# Patient Record
Sex: FEMALE | Race: WHITE | NOT HISPANIC OR LATINO | Employment: FULL TIME | URBAN - METROPOLITAN AREA
[De-identification: names, ages, dates, MRNs, and addresses within clinical notes are randomized per-mention and may not be internally consistent; named-entity substitution may affect disease eponyms.]

---

## 2019-07-17 ENCOUNTER — TRANSFERRED RECORDS (OUTPATIENT)
Dept: HEALTH INFORMATION MANAGEMENT | Facility: CLINIC | Age: 59
End: 2019-07-17

## 2023-07-11 ENCOUNTER — TRANSFERRED RECORDS (OUTPATIENT)
Dept: HEALTH INFORMATION MANAGEMENT | Facility: CLINIC | Age: 63
End: 2023-07-11

## 2024-02-09 ENCOUNTER — TRANSFERRED RECORDS (OUTPATIENT)
Dept: HEALTH INFORMATION MANAGEMENT | Facility: CLINIC | Age: 64
End: 2024-02-09

## 2024-02-09 LAB — CREATININE (EXTERNAL): 0.76 MG/DL (ref 0.52–1.04)

## 2024-02-15 ENCOUNTER — TRANSFERRED RECORDS (OUTPATIENT)
Dept: HEALTH INFORMATION MANAGEMENT | Facility: CLINIC | Age: 64
End: 2024-02-15

## 2024-07-10 ENCOUNTER — MEDICAL CORRESPONDENCE (OUTPATIENT)
Dept: HEALTH INFORMATION MANAGEMENT | Facility: CLINIC | Age: 64
End: 2024-07-10
Payer: COMMERCIAL

## 2024-07-20 ENCOUNTER — HOSPITAL ENCOUNTER (OUTPATIENT)
Dept: CT IMAGING | Facility: CLINIC | Age: 64
Discharge: HOME | End: 2024-07-20
Attending: SPECIALIST | Admitting: SPECIALIST
Payer: COMMERCIAL

## 2024-07-20 DIAGNOSIS — E21.3 HYPERPARATHYROIDISM (H): ICD-10-CM

## 2024-07-20 PROCEDURE — 250N000011 HC RX IP 250 OP 636

## 2024-07-20 PROCEDURE — 250N000009 HC RX 250

## 2024-07-20 PROCEDURE — 70492 CT SFT TSUE NCK W/O & W/DYE: CPT

## 2024-07-20 RX ORDER — IOPAMIDOL 755 MG/ML
67 INJECTION, SOLUTION INTRAVASCULAR ONCE
Status: COMPLETED | OUTPATIENT
Start: 2024-07-20 | End: 2024-07-20

## 2024-07-20 RX ADMIN — SODIUM CHLORIDE 80 ML: 9 INJECTION, SOLUTION INTRAVENOUS at 10:03

## 2024-07-20 RX ADMIN — IOPAMIDOL 67 ML: 755 INJECTION, SOLUTION INTRAVENOUS at 10:03

## 2024-08-01 ENCOUNTER — OFFICE VISIT (OUTPATIENT)
Dept: SURGERY | Facility: CLINIC | Age: 64
End: 2024-08-01
Payer: COMMERCIAL

## 2024-08-01 VITALS
BODY MASS INDEX: 29.02 KG/M2 | WEIGHT: 170 LBS | HEART RATE: 74 BPM | OXYGEN SATURATION: 97 % | SYSTOLIC BLOOD PRESSURE: 120 MMHG | DIASTOLIC BLOOD PRESSURE: 64 MMHG | HEIGHT: 64 IN

## 2024-08-01 DIAGNOSIS — E21.3 HYPERPARATHYROIDISM (H): Primary | ICD-10-CM

## 2024-08-01 PROCEDURE — 99244 OFF/OP CNSLTJ NEW/EST MOD 40: CPT | Performed by: SURGERY

## 2024-08-01 RX ORDER — VITAMIN B COMPLEX
TABLET ORAL DAILY
COMMUNITY

## 2024-08-01 RX ORDER — MULTIVITAMIN
1 TABLET ORAL DAILY
COMMUNITY

## 2024-08-01 RX ORDER — BUSPIRONE HYDROCHLORIDE 15 MG/1
15 TABLET ORAL 3 TIMES DAILY
COMMUNITY

## 2024-08-01 NOTE — LETTER
"August 2, 2024          Kenzie Estrella MD  ENDOCRINOLOGY [facility]  [address]  [place] [address]      RE:   Alta Whatley 1960      Dear Colleague,    Thank you for referring your patient, Alta Whatley, to [facility] at [facility]. Please see a copy of my visit note below.     Alta Whatley is a 63 year old female who presented with a history of hyperparathyroidism.  She has been followed for hypercalcemia and hyperparathyroidism for years.  At one point she was noted to have osteopenia, but surgery was not recommended at that time.  No history of nephrolithiasis.  Her calcium and PTH have continued to climb.  Most recent calcium was 10.5, PTH was 150.  Her osteopenia, on her last DEXA scan, has focally shifted to osteoporosis.  No history of pathologic fractures.  She has been sent on for surgical discussion given the change in her bone density and apparently progressive disease.     PMH:  Alta Whatley  has no past medical history on file.  PSH:  Alta Whatley  has no past surgical history on file.     Home medications and allergies reviewed.     Social History:  Alta Whatley  reports that she has never smoked. She has never used smokeless tobacco.  Family History:  Alta Whatley family history is not on file.     ROS:  The 10 point Review of Systems is negative other than noted in the HPI.  Patient does complain of toe pain but no bone pain, concentration issues.     Physical Exam:  Blood pressure 120/64, pulse 74, height 1.626 m (5' 4\"), weight 77.1 kg (170 lb), SpO2 97%.  170 lbs 0 oz  Healthy appearing female in no distress.  Patient has a pleasant affect and communicates well.   Pupils equal round and reactive to light.   No cervical lymphadenopathy or thyromegaly.  Good neck range of motion.  Skin creases sufficient for thyroid surgery.  Lung fields clear, breathing comfortably.   Heart normal sinus rhythm.  No murmurs rubs or gallops.  Abdomen soft, nontender, " nondistended.  Skin warm, dry.  No obvious rashes or lesions.     All new lab and imaging data was reviewed.  This includes extensive personal review of CT scan images.  No obvious parathyroid adenoma but possible candidate on the right behind the thyroid.      Assessment/plan: Pleasant 63-year-old female with hyperparathyroidism.  This does not appear to be consistent with secondary hyperparathyroidism or FHH.  I have explained that 85% of these instances are due to a single parathyroid adenoma.  The other 15% can be caused by multi gland disease.  Her CT scan was nonlocalizing, although I felt there was a suggestion of adenoma on the right.  I have ordered a nuclear medicine study for further delineation.  I do not expect that this will necessarily help us but I want to rule out all options.  If all of her imaging studies are consistent with nonlocalizing disease, I would then recommend a bilateral neck exploration with 4 gland evaluation.  She understands that there is a risk of failure to cure and hypoparathyroidism.  Surgery would likely be done as an outpatient.  I will touch base once the nuclear medicine study has been performed and we will get surgery scheduled.  Surgical co-morbities include heterozygous for factor V Leiden, anxiety, history of brain mass, dysthymia.    Again, thank you for allowing me to participate in the care of your patient.      Sincerely,      Lauro Villarreal MD

## 2024-08-02 PROBLEM — E21.3 HYPERPARATHYROIDISM (H): Status: ACTIVE | Noted: 2024-08-02

## 2024-08-02 NOTE — PROGRESS NOTES
"Surgery Consultation, [facility]         Lauro Villarreal MD, MD    Alta Whatley MRN# [id number]   YOB: 1960 Age: 63 year old     PCP:  Amy Daugherty    Chief Complaint: Hyperparathyroidism    Pt was seen in consultation from Amy Daugherty.    History of Present Illness:  Alta Whatley is a 63 year old female who presented with a history of hyperparathyroidism.  She has been followed for hypercalcemia and hyperparathyroidism for years.  At one point she was noted to have osteopenia, but surgery was not recommended at that time.  No history of nephrolithiasis.  Her calcium and PTH have continued to climb.  Most recent calcium was 10.5, PTH was 150.  Her osteopenia, on her last DEXA scan, has focally shifted to osteoporosis.  No history of pathologic fractures.  She has been sent on for surgical discussion given the change in her bone density and apparently progressive disease.    PMH:  Alta Whatley  has no past medical history on file.  PSH:  Alta Whatley  has no past surgical history on file.    Home medications and allergies reviewed.    Social History:  Alta Whatley  reports that she has never smoked. She has never used smokeless tobacco.  Family History:  Alta Whatley family history is not on file.    ROS:  The 10 point Review of Systems is negative other than noted in the HPI.  Patient does complain of toe pain but no bone pain, concentration issues.    Physical Exam:  Blood pressure 120/64, pulse 74, height 1.626 m (5' 4\"), weight 77.1 kg (170 lb), SpO2 97%.  170 lbs 0 oz  Healthy appearing female in no distress.  Patient has a pleasant affect and communicates well.   Pupils equal round and reactive to light.   No cervical lymphadenopathy or thyromegaly.  Good neck range of motion.  Skin creases sufficient for thyroid surgery.  Lung fields clear, breathing comfortably.   Heart normal sinus rhythm.  No murmurs rubs or gallops.  Abdomen soft, nontender, " nondistended.  Skin warm, dry.  No obvious rashes or lesions.    All new lab and imaging data was reviewed.  This includes extensive personal review of CT scan images.  No obvious parathyroid adenoma but possible candidate on the right behind the thyroid.     Assessment/plan: Pleasant 63-year-old female with hyperparathyroidism.  This does not appear to be consistent with secondary hyperparathyroidism or FHH.  I have explained that 85% of these instances are due to a single parathyroid adenoma.  The other 15% can be caused by multi gland disease.  Her CT scan was nonlocalizing, although I felt there was a suggestion of adenoma on the right.  I have ordered a nuclear medicine study for further delineation.  I do not expect that this will necessarily help us but I want to rule out all options.  If all of her imaging studies are consistent with nonlocalizing disease, I would then recommend a bilateral neck exploration with 4 gland evaluation.  She understands that there is a risk of failure to cure and hypoparathyroidism.  Surgery would likely be done as an outpatient.  I will touch base once the nuclear medicine study has been performed and we will get surgery scheduled.  Surgical co-morbities include heterozygous for factor V Leiden, anxiety, history of brain mass, dysthymia.    Franco Villarreal M.D.  [facility]  [phone]    Please route or send letter to:  Primary Care Provider (PCP) and Referring Provider

## 2024-09-05 ENCOUNTER — HOSPITAL ENCOUNTER (OUTPATIENT)
Dept: NUCLEAR MEDICINE | Facility: CLINIC | Age: 64
Setting detail: NUCLEAR MEDICINE
Discharge: HOME | End: 2024-09-05
Attending: SURGERY
Payer: COMMERCIAL

## 2024-09-05 DIAGNOSIS — E21.3 HYPERPARATHYROIDISM (H): ICD-10-CM

## 2024-09-05 PROCEDURE — 343N000001 HC RX 343: Performed by: SURGERY

## 2024-09-05 PROCEDURE — A9516 IODINE I-123 SOD IODIDE MIC: HCPCS | Performed by: SURGERY

## 2024-09-05 PROCEDURE — 78072 PARATHYRD PLANAR W/SPECT&CT: CPT

## 2024-09-05 PROCEDURE — A9500 TC99M SESTAMIBI: HCPCS | Performed by: SURGERY

## 2024-09-05 RX ADMIN — Medication 600 MICROCURIE: at 09:00

## 2024-09-05 RX ADMIN — Medication 27 MILLICURIE: at 12:00

## 2024-09-11 ENCOUNTER — TELEPHONE (OUTPATIENT)
Dept: SURGERY | Facility: CLINIC | Age: 64
End: 2024-09-11
Payer: COMMERCIAL

## 2024-09-11 NOTE — TELEPHONE ENCOUNTER
Name of caller: Patient    Reason for Call:  Alta calling to discuss her [place] imaging results and weather or not to schedule surgery?    Surgeon:  Lauro Villarreal MD    Recent Surgery:  No    If yes, when & what type:  N/A      Best phone number to reach pt at is: [phone]    Ok to leave a message with medical info? Yes.

## 2024-09-13 ENCOUNTER — PREP FOR PROCEDURE (OUTPATIENT)
Dept: SURGERY | Facility: CLINIC | Age: 64
End: 2024-09-13
Payer: COMMERCIAL

## 2024-09-13 DIAGNOSIS — E21.3 HYPERPARATHYROIDISM (H): Primary | ICD-10-CM

## 2024-09-19 ENCOUNTER — TELEPHONE (OUTPATIENT)
Dept: SURGERY | Facility: CLINIC | Age: 64
End: 2024-09-19
Payer: COMMERCIAL

## 2024-09-19 NOTE — TELEPHONE ENCOUNTER
Type of surgery: right parathyroidectomy  Location of surgery: [facility]  Date and time of surgery: 11/22/24 9:30am  Surgeon: Dr Villarreal  Pre-Op Appt Date: pt to schedule  Post-Op Appt Date: pt to schedule   Packet sent out: Yes  Pre-cert/Authorization completed:  Not Applicable  Date: 9/13/24     Clear bilaterally, pupils equal, round and reactive to light.

## 2024-10-06 ENCOUNTER — HEALTH MAINTENANCE LETTER (OUTPATIENT)
Age: 64
End: 2024-10-06

## 2024-11-18 RX ORDER — ALENDRONATE SODIUM 70 MG/1
70 TABLET ORAL
COMMUNITY

## 2024-11-18 RX ORDER — ACETAMINOPHEN 500 MG
500-1000 TABLET ORAL EVERY 6 HOURS PRN
COMMUNITY

## 2024-11-18 RX ORDER — HYDROXYZINE HYDROCHLORIDE 25 MG/1
25 TABLET, FILM COATED ORAL 3 TIMES DAILY PRN
COMMUNITY

## 2024-11-18 RX ORDER — ESTRADIOL 0.1 MG/G
CREAM VAGINAL
COMMUNITY

## 2024-11-18 RX ORDER — CIPROFLOXACIN 250 MG/1
250 TABLET, FILM COATED ORAL 2 TIMES DAILY
COMMUNITY

## 2024-11-18 RX ORDER — ASPIRIN 81 MG/1
81 TABLET ORAL DAILY
COMMUNITY

## 2024-11-22 ENCOUNTER — HOSPITAL ENCOUNTER (OUTPATIENT)
Facility: CLINIC | Age: 64
Discharge: HOME | End: 2024-11-22
Attending: SURGERY | Admitting: SURGERY
Payer: COMMERCIAL

## 2024-11-22 VITALS
HEIGHT: 64 IN | SYSTOLIC BLOOD PRESSURE: 147 MMHG | BODY MASS INDEX: 29.88 KG/M2 | RESPIRATION RATE: 16 BRPM | HEART RATE: 64 BPM | DIASTOLIC BLOOD PRESSURE: 82 MMHG | OXYGEN SATURATION: 97 % | TEMPERATURE: 97.7 F | WEIGHT: 175 LBS

## 2024-11-22 DIAGNOSIS — E21.3 HYPERPARATHYROIDISM (H): ICD-10-CM

## 2024-11-22 DIAGNOSIS — G89.18 ACUTE POST-OPERATIVE PAIN: Primary | ICD-10-CM

## 2024-11-22 LAB
PTH-INTACT SERPL-MCNC: 119 PG/ML (ref 15–65)
PTH-INTACT SERPL-MCNC: 30 PG/ML (ref 15–65)
PTH-INTACT SERPL-MCNC: 46 PG/ML (ref 15–65)

## 2024-11-22 PROCEDURE — 60500 EXPLORE PARATHYROID GLANDS: CPT | Performed by: SURGERY

## 2024-11-22 PROCEDURE — 60500 EXPLORE PARATHYROID GLANDS: CPT | Mod: AS | Performed by: PHYSICIAN ASSISTANT

## 2024-11-22 PROCEDURE — 272N000001 HC OR GENERAL SUPPLY STERILE: Performed by: SURGERY

## 2024-11-22 PROCEDURE — 999N000141 HC STATISTIC PRE-PROCEDURE NURSING ASSESSMENT: Performed by: SURGERY

## 2024-11-22 PROCEDURE — 710N000012 HC RECOVERY PHASE 2, PER MINUTE: Performed by: SURGERY

## 2024-11-22 PROCEDURE — 88331 PATH CONSLTJ SURG 1 BLK 1SPC: CPT | Mod: TC | Performed by: SURGERY

## 2024-11-22 PROCEDURE — 710N000009 HC RECOVERY PHASE 1, LEVEL 1, PER MIN: Performed by: SURGERY

## 2024-11-22 PROCEDURE — 360N000077 HC SURGERY LEVEL 4, PER MIN: Performed by: SURGERY

## 2024-11-22 PROCEDURE — 272N000002 HC OR SUPPLY OTHER OPNP: Performed by: SURGERY

## 2024-11-22 PROCEDURE — 36415 COLL VENOUS BLD VENIPUNCTURE: CPT | Performed by: SURGERY

## 2024-11-22 PROCEDURE — 370N000017 HC ANESTHESIA TECHNICAL FEE, PER MIN: Performed by: SURGERY

## 2024-11-22 PROCEDURE — 88331 PATH CONSLTJ SURG 1 BLK 1SPC: CPT | Mod: 26 | Performed by: STUDENT IN AN ORGANIZED HEALTH CARE EDUCATION/TRAINING PROGRAM

## 2024-11-22 PROCEDURE — 88305 TISSUE EXAM BY PATHOLOGIST: CPT | Mod: 26 | Performed by: PATHOLOGY

## 2024-11-22 PROCEDURE — 83970 ASSAY OF PARATHORMONE: CPT | Performed by: SURGERY

## 2024-11-22 PROCEDURE — 250N000025 HC SEVOFLURANE, PER MIN: Performed by: SURGERY

## 2024-11-22 PROCEDURE — 250N000009 HC RX 250: Performed by: SURGERY

## 2024-11-22 RX ORDER — ONDANSETRON 2 MG/ML
4 INJECTION INTRAMUSCULAR; INTRAVENOUS EVERY 30 MIN PRN
Status: DISCONTINUED | OUTPATIENT
Start: 2024-11-22 | End: 2024-11-22 | Stop reason: HOSPADM

## 2024-11-22 RX ORDER — ONDANSETRON 4 MG/1
4 TABLET, ORALLY DISINTEGRATING ORAL EVERY 30 MIN PRN
Status: DISCONTINUED | OUTPATIENT
Start: 2024-11-22 | End: 2024-11-22 | Stop reason: HOSPADM

## 2024-11-22 RX ORDER — CALCIUM CARBONATE 500(1250)
1 TABLET ORAL 2 TIMES DAILY
Qty: 60 TABLET | Refills: 1 | Status: SHIPPED | OUTPATIENT
Start: 2024-11-22

## 2024-11-22 RX ORDER — BUPIVACAINE HYDROCHLORIDE AND EPINEPHRINE 2.5; 5 MG/ML; UG/ML
INJECTION, SOLUTION EPIDURAL; INFILTRATION; INTRACAUDAL; PERINEURAL
Status: DISCONTINUED
Start: 2024-11-22 | End: 2024-11-22 | Stop reason: HOSPADM

## 2024-11-22 RX ORDER — OXYCODONE HYDROCHLORIDE 5 MG/1
5 TABLET ORAL
Status: DISCONTINUED | OUTPATIENT
Start: 2024-11-22 | End: 2024-11-22 | Stop reason: HOSPADM

## 2024-11-22 RX ORDER — OXYCODONE HYDROCHLORIDE 5 MG/1
2.5-5 TABLET ORAL EVERY 4 HOURS PRN
Qty: 10 TABLET | Refills: 0 | Status: SHIPPED | OUTPATIENT
Start: 2024-11-22

## 2024-11-22 RX ORDER — DEXAMETHASONE SODIUM PHOSPHATE 4 MG/ML
4 INJECTION, SOLUTION INTRA-ARTICULAR; INTRALESIONAL; INTRAMUSCULAR; INTRAVENOUS; SOFT TISSUE
Status: DISCONTINUED | OUTPATIENT
Start: 2024-11-22 | End: 2024-11-22 | Stop reason: HOSPADM

## 2024-11-22 RX ORDER — HYDROMORPHONE HCL IN WATER/PF 6 MG/30 ML
0.2 PATIENT CONTROLLED ANALGESIA SYRINGE INTRAVENOUS EVERY 5 MIN PRN
Status: DISCONTINUED | OUTPATIENT
Start: 2024-11-22 | End: 2024-11-22 | Stop reason: HOSPADM

## 2024-11-22 RX ORDER — NALOXONE HYDROCHLORIDE 0.4 MG/ML
0.1 INJECTION, SOLUTION INTRAMUSCULAR; INTRAVENOUS; SUBCUTANEOUS
Status: DISCONTINUED | OUTPATIENT
Start: 2024-11-22 | End: 2024-11-22 | Stop reason: HOSPADM

## 2024-11-22 RX ORDER — MAGNESIUM HYDROXIDE 1200 MG/15ML
LIQUID ORAL PRN
Status: DISCONTINUED | OUTPATIENT
Start: 2024-11-22 | End: 2024-11-22 | Stop reason: HOSPADM

## 2024-11-22 RX ORDER — HYDROMORPHONE HCL IN WATER/PF 6 MG/30 ML
0.4 PATIENT CONTROLLED ANALGESIA SYRINGE INTRAVENOUS EVERY 5 MIN PRN
Status: DISCONTINUED | OUTPATIENT
Start: 2024-11-22 | End: 2024-11-22 | Stop reason: HOSPADM

## 2024-11-22 RX ORDER — CEFAZOLIN SODIUM/WATER 2 G/20 ML
2 SYRINGE (ML) INTRAVENOUS
Status: COMPLETED | OUTPATIENT
Start: 2024-11-22 | End: 2024-11-22

## 2024-11-22 RX ORDER — BUPIVACAINE HYDROCHLORIDE AND EPINEPHRINE 2.5; 5 MG/ML; UG/ML
INJECTION, SOLUTION INFILTRATION; PERINEURAL PRN
Status: DISCONTINUED | OUTPATIENT
Start: 2024-11-22 | End: 2024-11-22 | Stop reason: HOSPADM

## 2024-11-22 RX ORDER — CEFAZOLIN SODIUM/WATER 2 G/20 ML
2 SYRINGE (ML) INTRAVENOUS SEE ADMIN INSTRUCTIONS
Status: DISCONTINUED | OUTPATIENT
Start: 2024-11-22 | End: 2024-11-22 | Stop reason: HOSPADM

## 2024-11-22 RX ORDER — AMOXICILLIN 250 MG
1 CAPSULE ORAL 2 TIMES DAILY
Qty: 15 TABLET | Refills: 0 | Status: SHIPPED | OUTPATIENT
Start: 2024-11-22

## 2024-11-22 RX ORDER — FENTANYL CITRATE 50 UG/ML
50 INJECTION, SOLUTION INTRAMUSCULAR; INTRAVENOUS EVERY 5 MIN PRN
Status: DISCONTINUED | OUTPATIENT
Start: 2024-11-22 | End: 2024-11-22 | Stop reason: HOSPADM

## 2024-11-22 RX ORDER — FENTANYL CITRATE 50 UG/ML
25 INJECTION, SOLUTION INTRAMUSCULAR; INTRAVENOUS EVERY 5 MIN PRN
Status: DISCONTINUED | OUTPATIENT
Start: 2024-11-22 | End: 2024-11-22 | Stop reason: HOSPADM

## 2024-11-22 RX ORDER — SODIUM CHLORIDE, SODIUM LACTATE, POTASSIUM CHLORIDE, CALCIUM CHLORIDE 600; 310; 30; 20 MG/100ML; MG/100ML; MG/100ML; MG/100ML
INJECTION, SOLUTION INTRAVENOUS CONTINUOUS
Status: DISCONTINUED | OUTPATIENT
Start: 2024-11-22 | End: 2024-11-22 | Stop reason: HOSPADM

## 2024-11-22 ASSESSMENT — ACTIVITIES OF DAILY LIVING (ADL)
ADLS_ACUITY_SCORE: 0

## 2024-11-22 NOTE — BRIEF OP NOTE
[facility]  General Surgery Brief Operative Note    Pre-operative diagnosis: Hyperparathyroidism (H) [E21.3]   Post-operative diagnosis Same   Procedure: Procedure(s):  RIGHT SUPERIOR PARATHYROIDECTOMY    Surgeon(s), Assistant(s): Surgeons and Role:     * Lauro Villarreal MD - Primary     * Viridiana Glez PA-C - Assisting   Estimated blood loss: 5 mL    Drains: None   Specimens: ID Type Source Tests Collected by Time Destination   1 : RIGHT SUPERIOR PARATHYROID Tissue Parathyroid, Superior, Right SURGICAL PATHOLOGY EXAM Lauro Villarreal MD 11/22/2024  9:23 AM    A : PARATHYROID HORMONE INTACT Blood Line, Other PARATHYROID HORMONE INTACT Lauro Villarreal MD 11/22/2024  9:34 AM    B : PARATHYROID HORMONE INTACT Blood Line, Other PARATHYROID HORMONE INTACT Lauro Villarreal MD 11/22/2024  9:44 AM       Findings: See op note.  Right RLN and right inferior parathyroid identified and preserved .   Complications:  Condition: None  Stable   Comments:      Viridiana Glez PA-C  [facility]         See dictated operative report for full details

## 2024-11-22 NOTE — DISCHARGE INSTRUCTIONS
[facility]  Discharge Instructions: Post-Operative Parathyroid Surgery    ACTIVITY  Take frequent, short walks and increase your activity gradually.    Avoid strenuous physical activity or heavy lifting greater than 15-20 lbs. for 1-2 weeks.  You may climb stairs.  You may drive without restrictions when you are not using any prescription pain medication and feel comfortable in a car.  You may return to work/school when you are comfortable without any prescription pain medication.    WOUND CARE  You may remove your bandage and shower 48 hours after the surgery.  Pat your incision dry and leave it open to air.  Re-apply dressing (Band-Aids or gauze/tape) as needed for comfort or drainage.  You may have steri-strips (looks like white tape) on your incision.  You may peel off the steri-strips 2 weeks after your surgery if they have not peeled off on their own.  If you have skin glue, it will peel up and fall off on its own.  Do not soak your incision in a tub or pool for 2 weeks.   Do not apply any lotions, creams, or ointments to your incision.  A ridge under your incision is normal and will gradually resolve.    DIET  Start with liquids, then gradually resume your regular diet as tolerated.    Drink plenty of fluids to stay hydrated.    PAIN  Expect some tenderness and discomfort at the incision site(s).  Use the prescribed pain medication at your discretion.  Expect gradual resolution of your pain over several days.  You may take ibuprofen with food (unless you have been told not to) or acetaminophen/Tylenol instead of or in addition to your prescribed pain medication.  However, if you are taking Norco or Percocet, do not take any additional acetaminophen/Tylenol.  Do not drink alcohol or drive while you are taking pain medications.  You may apply ice to your incisions in 20 minute intervals as needed for the next 48 hours.  After that time, consider switching to heat if you  prefer.    EXPECTATIONS  Watch for symptoms of numbness or tingling around the mouth or in the fingers or toes.  This may be a sign of a low calcium level.  Please contact the office so we can evaluate your symptoms.  You may need to have your blood calcium level checked by doing a simple blood test.  Bruising may occur in middle of your chest or at the tops of your feet.  This is due to lead placement for the nerve monitor and blood draws respectively.  If bruising occurs, it will resolve with time.    Pain medications can cause constipation.  Limit use when possible.  Take an over the counter or prescribed stool softener/stimulant, such as Colace or Senna, 1-2 times a day with plenty of water.  You may take a mild over the counter laxative, such as Miralax or a suppository, as needed.    You may discontinue these medications once you are having regular bowel movements and/or are no longer taking your narcotic pain medication.    RETURN APPOINTMENT  Follow up with Dr. Villarreal in 2-3 weeks. Call [phone] to schedule an appointment or if you have any concerns. We are located at [address] [place] [address].     CALL OUR OFFICE -[phone] IF YOU HAVE:   Chills or fever above 101 F.  Increased redness, warmth, or drainage at your incisions.  Significant bleeding.  Pain not relieved by your pain medication or rest.  Increasing pain after the first 48 hours.  Any other concerns or questions.             Revised August 2024    Same Day Surgery Discharge Instructions for  Sedation and General Anesthesia     It's not unusual to feel dizzy, light-headed or faint for up to 24 hours after surgery or while taking pain medication.  If you have these symptoms: sit for a few minutes before standing and have someone assist you when you get up to walk or use the bathroom.    You should rest and relax for the next 24 hours. We recommend you make arrangements to have an adult stay with you for at least  24 hours after your discharge.  Avoid hazardous and strenuous activity.    DO NOT DRIVE any vehicle or operate mechanical equipment for 24 hours following the end of your surgery.  Even though you may feel normal, your reactions may be affected by the medication you have received.    Do not drink alcoholic beverages for 24 hours following surgery.     Slowly progress to your regular diet as you feel able. It's not unusual to feel nauseated and/or vomit after receiving anesthesia.  If you develop these symptoms, drink clear liquids (apple juice, ginger ale, broth, 7-up, etc. ) until you feel better.  If your nausea and vomiting persists for 24 hours, please notify your surgeon.      All narcotic pain medications, along with inactivity and anesthesia, can cause constipation. Drinking plenty of liquids and increasing fiber intake will help.    For any questions of a medical nature, call your surgeon.    Do not make important decisions for 24 hours.    If you had general anesthesia, you may have a sore throat for a couple of days related to the breathing tube used during surgery.  You may use Cepacol lozenges to help with this discomfort.  If it worsens or if you develop a fever, contact your surgeon.     If you feel your pain is not well managed with the pain medications prescribed by your surgeon, please contact your surgeon's office to let them know so they can address your concerns.     **If you have concerns or questions about your procedure,    please contact Dr Villarreal at  [phone]**

## 2024-11-22 NOTE — OP NOTE
General Surgery Operative Note    PREOPERATIVE DIAGNOSIS:  Primary hyperparathyroidism.    POSTOPERATIVE DIAGNOSIS:  Primary hyperparathyroidism.    PROCEDURE:   Excision of right superior parathyroid adenoma, Unilateral neck exploration.    ANESTHESIA:  General.    PREOPERATIVE MEDICATIONS:  Ancef IV.    SURGEON:  Lauro Villarreal MD, MD    ASSISTANT:  Viridiana Glez PA-C  First assistant was necessary due to challenging exposure and the need for improved visualization and help maintaining hemostasis.      ESTIMATED BLOOD LOSS:  5 cc's    INDICATIONS:  Alta Whatley is a 64 year old female who has primary hyperparathyroidism. She has had symptoms of fatigue.  She has been found to have an elevated calcium of 11.  She has a localizing imaging scan in the right middle neck.  She presents today for neck exploration, excision of parathyroid adenoma.  Her PTH preoperatively is 111.    DESCRIPTION OF PROCEDURE:  The patient was placed supine, head and neck in extension and a bump between the scapulae.  Transverse cervical neck creases had been marked in the preinduction area and the one most suitable was utilized for exposure.  Incision was made, superior and inferior skin flaps raised.  Midline fascia opened and reflected to the right.  An abnormal parathyroid was identified at the right superior location.  It was meticulously dissected from the surrounding tissue and submitted for frozen section which confirmed a  hypercellular parathyroid.  We then explored the right inferior neck.  During the exploration, I encountered what I believe to be the second parathyroid. It was not biopsied but appeared to represent normal parathyroid tissue. The site was irrigated and inspected for hemostasis.  The PTH assays were sent at 15 and 25 minutes post-excision and the first value came back at 46, signifying the completeness of the dissection.  The patient's incision site was then closed with running 3-0 Vicryl for the midline  fascia, interrupted for platysma and 4-0 subcuticular Monocryl for skin.  Marcaine 0.25% plain was instilled and the patient transferred to recovery in good condition.    INTRAOPERATIVE FINDINGS:  1.  A hypercellular right superior parathyroid correlated nicely with preoperative imaging.  2.  Second right-sided parathyroid appeared to be grossly normal.  3.  PTH assay diminished from 111 to 46 at 15 minutes post excision.  4.  Grossly normal thyroid.    Specimens: R superior parathyroid gland    Lauro Villarreal MD, MD

## 2024-11-26 LAB
PATH REPORT.COMMENTS IMP SPEC: NORMAL
PATH REPORT.COMMENTS IMP SPEC: NORMAL
PATH REPORT.FINAL DX SPEC: NORMAL
PATH REPORT.GROSS SPEC: NORMAL
PATH REPORT.INTRAOP OBS SPEC DOC: NORMAL
PATH REPORT.MICROSCOPIC SPEC OTHER STN: NORMAL
PATH REPORT.RELEVANT HX SPEC: NORMAL
PHOTO IMAGE: NORMAL

## 2024-12-12 ENCOUNTER — OFFICE VISIT (OUTPATIENT)
Dept: SURGERY | Facility: CLINIC | Age: 64
End: 2024-12-12
Payer: COMMERCIAL

## 2024-12-12 ENCOUNTER — LAB (OUTPATIENT)
Dept: LAB | Facility: CLINIC | Age: 64
End: 2024-12-12
Payer: COMMERCIAL

## 2024-12-12 DIAGNOSIS — E21.3 HYPERPARATHYROIDISM (H): Primary | ICD-10-CM

## 2024-12-12 DIAGNOSIS — E21.3 HYPERPARATHYROIDISM (H): ICD-10-CM

## 2024-12-12 LAB
CA-I BLD-MCNC: 4.8 MG/DL (ref 4.4–5.2)
PTH-INTACT SERPL-MCNC: 50 PG/ML (ref 15–65)

## 2024-12-12 PROCEDURE — 82330 ASSAY OF CALCIUM: CPT

## 2024-12-12 PROCEDURE — 83970 ASSAY OF PARATHORMONE: CPT

## 2024-12-12 PROCEDURE — 36415 COLL VENOUS BLD VENIPUNCTURE: CPT

## 2024-12-12 PROCEDURE — 99024 POSTOP FOLLOW-UP VISIT: CPT | Performed by: SURGERY

## 2024-12-12 NOTE — LETTER
December 19, 2024          Kenzie Estrella MD  ENDOCRINOLOGY [facility]  [address]  [place] [address]      RE:   Alta Whatley 1960      Dear Colleague,    Thank you for referring your patient, Alta Whatley, to [facility] - [facility]. Please see a copy of my visit note below.    Alta Whatley presents today for surgical followup.  she is doing well following surgery for hyperparathyroidism.  We performed a right sided neck exploration and found a sizable right superior parathyroid adenoma.  PTH dropped from 111-46 immediately after gland removal.  Gland weighed 618 mg.  Patient is feeling well at this point.  Ionized calcium was 4.8 and PTH was 50, both well within normal limits.  We will have her taper down on her supplemental calcium.  Incisions look fine with no signs of wound infection.  No symptoms of hypocalcemia.  I expect her to make a complete recovery.      Again, thank you for allowing me to participate in the care of your patient.      Sincerely,      Lauro Villarreal MD

## 2024-12-17 NOTE — PROGRESS NOTES
Surgery Postop Note    Alta Whatley presents today for surgical followup.  she is doing well following surgery for hyperparathyroidism.  We performed a right sided neck exploration and found a sizable right superior parathyroid adenoma.  PTH dropped from 111-46 immediately after gland removal.  Gland weighed 618 mg.  Patient is feeling well at this point.  Ionized calcium was 4.8 and PTH was 50, both well within normal limits.  We will have her taper down on her supplemental calcium.  Incisions look fine with no signs of wound infection.  No symptoms of hypocalcemia.  I expect her to make a complete recovery.  Thank you for the opportunity to help in her care.    Franco Villarreal M.D.  [facility]  [phone]    Please send Dr. Estrella a copy of the operative note and the pathology report.

## (undated) DEVICE — NEEDLE HYPO MONOJECT STANDARD 22GA 1 1/2IN BLUE 1188822112

## (undated) DEVICE — SOL WATER IRRIG 1000ML BOTTLE 2F7114

## (undated) DEVICE — LINEN TOWEL PACK X5 5464

## (undated) DEVICE — SYR 03ML LL W/O NDL 309657

## (undated) DEVICE — SU MONOCRYL 4-0 P-3 18" UND Y494G

## (undated) DEVICE — SYR EAR BULB 3OZ 0035830

## (undated) DEVICE — ESU ELEC BLADE 2.75" COATED/INSULATED E1455

## (undated) DEVICE — ESU PENCIL W/SMOKE EVAC NEPTUNE STRYKER 0703-046-000

## (undated) DEVICE — GLOVE BIOGEL PI MICRO INDICATOR UNDERGLOVE SZ 7.5 48975

## (undated) DEVICE — NDL 25GA 1.5" 305127

## (undated) DEVICE — SU VICRYL 3-0 SH 27" UND J416H

## (undated) DEVICE — NIM PROBE PRASS STIMULATOR PROTECTED TIP 8225101

## (undated) DEVICE — BLADE KNIFE SURG 15 371115

## (undated) DEVICE — ESU GROUND PAD UNIVERSAL W/O CORD

## (undated) DEVICE — DRSG TEGADERM 2 3/8X2 3/4" 1624W

## (undated) DEVICE — DISSECTOR SONICISION 7 CURVED JAW 13CM SCD7A13

## (undated) DEVICE — GLOVE BIOGEL PI MICRO SZ 7.5 48575

## (undated) DEVICE — DECANTER VIAL 2006S

## (undated) DEVICE — MANIFOLD NEPTUNE 4 PORT 700-20

## (undated) DEVICE — Device

## (undated) DEVICE — PACK MINOR SBA15MIFSE

## (undated) DEVICE — ESU HOLSTER PLASTIC DISP E2400

## (undated) DEVICE — PACK UNIVERSAL SPLIT 29131

## (undated) DEVICE — PREP CHLORAPREP W/ORANGE TINT 10.5ML 930715

## (undated) DEVICE — SOL NACL 0.9% IRRIG 1000ML BOTTLE 2F7124

## (undated) DEVICE — SU TIE VICRYL+ 3-0 12X18IN VIO VCP104G

## (undated) DEVICE — SU DERMABOND MINI DHVM12

## (undated) RX ORDER — DEXAMETHASONE SODIUM PHOSPHATE 4 MG/ML
INJECTION, SOLUTION INTRA-ARTICULAR; INTRALESIONAL; INTRAMUSCULAR; INTRAVENOUS; SOFT TISSUE
Status: DISPENSED
Start: 2024-11-22

## (undated) RX ORDER — REMIFENTANIL HYDROCHLORIDE 1 MG/ML
INJECTION, POWDER, LYOPHILIZED, FOR SOLUTION INTRAVENOUS
Status: DISPENSED
Start: 2024-11-22

## (undated) RX ORDER — FENTANYL CITRATE 50 UG/ML
INJECTION, SOLUTION INTRAMUSCULAR; INTRAVENOUS
Status: DISPENSED
Start: 2024-11-22

## (undated) RX ORDER — ONDANSETRON 2 MG/ML
INJECTION INTRAMUSCULAR; INTRAVENOUS
Status: DISPENSED
Start: 2024-11-22

## (undated) RX ORDER — PROPOFOL 10 MG/ML
INJECTION, EMULSION INTRAVENOUS
Status: DISPENSED
Start: 2024-11-22

## (undated) RX ORDER — GLYCOPYRROLATE 0.2 MG/ML
INJECTION, SOLUTION INTRAMUSCULAR; INTRAVENOUS
Status: DISPENSED
Start: 2024-11-22